# Patient Record
Sex: FEMALE | Race: WHITE | Employment: OTHER | URBAN - METROPOLITAN AREA
[De-identification: names, ages, dates, MRNs, and addresses within clinical notes are randomized per-mention and may not be internally consistent; named-entity substitution may affect disease eponyms.]

---

## 2019-07-31 ENCOUNTER — APPOINTMENT (OUTPATIENT)
Dept: CT IMAGING | Age: 49
End: 2019-07-31
Payer: COMMERCIAL

## 2019-07-31 ENCOUNTER — HOSPITAL ENCOUNTER (EMERGENCY)
Age: 49
Discharge: HOME | End: 2019-07-31
Attending: EMERGENCY MEDICINE
Payer: COMMERCIAL

## 2019-07-31 ENCOUNTER — APPOINTMENT (OUTPATIENT)
Dept: GENERAL RADIOLOGY | Age: 49
End: 2019-07-31
Payer: COMMERCIAL

## 2019-07-31 VITALS
DIASTOLIC BLOOD PRESSURE: 60 MMHG | TEMPERATURE: 97.6 F | RESPIRATION RATE: 18 BRPM | HEART RATE: 94 BPM | WEIGHT: 210.19 LBS | SYSTOLIC BLOOD PRESSURE: 121 MMHG | BODY MASS INDEX: 33.78 KG/M2 | HEIGHT: 66 IN | OXYGEN SATURATION: 95 %
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
## 2019-07-31 DIAGNOSIS — S16.1XXA ACUTE STRAIN OF NECK MUSCLE, INITIAL ENCOUNTER: ICD-10-CM

## 2019-07-31 PROCEDURE — 72125 CT NECK SPINE W/O DYE: CPT

## 2019-07-31 PROCEDURE — 72100 X-RAY EXAM L-S SPINE 2/3 VWS: CPT

## 2019-07-31 PROCEDURE — 6370000000 HC RX 637 (ALT 250 FOR IP): Performed by: NURSE PRACTITIONER

## 2019-07-31 PROCEDURE — 99284 EMERGENCY DEPT VISIT MOD MDM: CPT

## 2019-07-31 PROCEDURE — 72040 X-RAY EXAM NECK SPINE 2-3 VW: CPT

## 2019-07-31 RX ORDER — LORATADINE 10 MG/1
10 CAPSULE, LIQUID FILLED ORAL DAILY
COMMUNITY

## 2019-07-31 RX ORDER — CLONAZEPAM 0.5 MG/1
0.5 TABLET ORAL 4 TIMES DAILY
COMMUNITY

## 2019-07-31 RX ORDER — IBUPROFEN 800 MG/1
800 TABLET ORAL ONCE
Status: COMPLETED | OUTPATIENT
Start: 2019-07-31 | End: 2019-07-31

## 2019-07-31 RX ORDER — AMLODIPINE BESYLATE 2.5 MG/1
2.5 TABLET ORAL DAILY
COMMUNITY

## 2019-07-31 RX ORDER — VENLAFAXINE 75 MG/1
75 TABLET ORAL 3 TIMES DAILY
COMMUNITY

## 2019-07-31 RX ORDER — HYDROCODONE BITARTRATE AND ACETAMINOPHEN 5; 325 MG/1; MG/1
1 TABLET ORAL EVERY 6 HOURS PRN
Qty: 20 TABLET | Refills: 0 | Status: SHIPPED | OUTPATIENT
Start: 2019-07-31 | End: 2019-08-03

## 2019-07-31 RX ORDER — METHOCARBAMOL 500 MG/1
500 TABLET, FILM COATED ORAL 3 TIMES DAILY PRN
Qty: 20 TABLET | Refills: 0 | Status: SHIPPED | OUTPATIENT
Start: 2019-07-31 | End: 2019-08-10

## 2019-07-31 RX ADMIN — IBUPROFEN 800 MG: 800 TABLET, FILM COATED ORAL at 15:28

## 2019-07-31 ASSESSMENT — PAIN SCALES - GENERAL
PAINLEVEL_OUTOF10: 9
PAINLEVEL_OUTOF10: 9
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
## 2020-06-30 ENCOUNTER — HOSPITAL ENCOUNTER (OUTPATIENT)
Age: 50
Setting detail: SPECIMEN
Discharge: HOME | End: 2020-06-30
Payer: COMMERCIAL

## 2020-06-30 LAB
ABSOLUTE EOS #: 0.07 K/UL (ref 0–0.44)
ABSOLUTE IMMATURE GRANULOCYTE: <0.03 K/UL (ref 0–0.3)
ABSOLUTE LYMPH #: 1.77 K/UL (ref 1.1–3.7)
ABSOLUTE MONO #: 0.48 K/UL (ref 0.1–1.2)
ALBUMIN SERPL-MCNC: 4.3 G/DL (ref 3.5–5.2)
ALBUMIN/GLOBULIN RATIO: 1.5 (ref 1–2.5)
ALP BLD-CCNC: 61 U/L (ref 35–104)
ALT SERPL-CCNC: 70 U/L (ref 5–33)
ANION GAP SERPL CALCULATED.3IONS-SCNC: 16 MMOL/L (ref 9–17)
AST SERPL-CCNC: 43 U/L
BASOPHILS # BLD: 0 % (ref 0–2)
BASOPHILS ABSOLUTE: <0.03 K/UL (ref 0–0.2)
BILIRUB SERPL-MCNC: 0.36 MG/DL (ref 0.3–1.2)
BUN BLDV-MCNC: 13 MG/DL (ref 6–20)
BUN/CREAT BLD: ABNORMAL (ref 9–20)
CALCIUM SERPL-MCNC: 9.1 MG/DL (ref 8.6–10.4)
CHLORIDE BLD-SCNC: 101 MMOL/L (ref 98–107)
CHOLESTEROL, FASTING: 220 MG/DL
CHOLESTEROL/HDL RATIO: 5.8
CO2: 22 MMOL/L (ref 20–31)
CREAT SERPL-MCNC: 0.52 MG/DL (ref 0.5–0.9)
DIFFERENTIAL TYPE: NORMAL
EOSINOPHILS RELATIVE PERCENT: 1 % (ref 1–4)
ESTRADIOL LEVEL: 58 PG/ML (ref 27–314)
GFR AFRICAN AMERICAN: >60 ML/MIN
GFR NON-AFRICAN AMERICAN: >60 ML/MIN
GFR SERPL CREATININE-BSD FRML MDRD: ABNORMAL ML/MIN/{1.73_M2}
GFR SERPL CREATININE-BSD FRML MDRD: ABNORMAL ML/MIN/{1.73_M2}
GLUCOSE FASTING: 117 MG/DL (ref 70–99)
HCT VFR BLD CALC: 43.6 % (ref 36.3–47.1)
HDLC SERPL-MCNC: 38 MG/DL
HEMOGLOBIN: 14.3 G/DL (ref 11.9–15.1)
IMMATURE GRANULOCYTES: 0 %
LDL CHOLESTEROL: 140 MG/DL (ref 0–130)
LYMPHOCYTES # BLD: 33 % (ref 24–43)
MCH RBC QN AUTO: 31.6 PG (ref 25.2–33.5)
MCHC RBC AUTO-ENTMCNC: 32.8 G/DL (ref 28.4–34.8)
MCV RBC AUTO: 96.2 FL (ref 82.6–102.9)
MONOCYTES # BLD: 9 % (ref 3–12)
NRBC AUTOMATED: 0 PER 100 WBC
PDW BLD-RTO: 12.4 % (ref 11.8–14.4)
PLATELET # BLD: 357 K/UL (ref 138–453)
PLATELET ESTIMATE: NORMAL
PMV BLD AUTO: 10.7 FL (ref 8.1–13.5)
POTASSIUM SERPL-SCNC: 3.9 MMOL/L (ref 3.7–5.3)
PROGESTERONE LEVEL: 6.54 NG/ML
RBC # BLD: 4.53 M/UL (ref 3.95–5.11)
RBC # BLD: NORMAL 10*6/UL
SEG NEUTROPHILS: 57 % (ref 36–65)
SEGMENTED NEUTROPHILS ABSOLUTE COUNT: 2.99 K/UL (ref 1.5–8.1)
SEX HORMONE BINDING GLOBULIN: 41 NMOL/L (ref 30–135)
SODIUM BLD-SCNC: 139 MMOL/L (ref 135–144)
T3 FREE: 3.09 PG/ML (ref 2.02–4.43)
TESTOSTERONE FREE-NONMALE: 2.8 PG/ML (ref 1.1–5.8)
TESTOSTERONE TOTAL: 18 NG/DL (ref 20–70)
THYROXINE, FREE: 1.23 NG/DL (ref 0.93–1.7)
TOTAL PROTEIN: 7.1 G/DL (ref 6.4–8.3)
TRIGLYCERIDE, FASTING: 209 MG/DL
TSH SERPL DL<=0.05 MIU/L-ACNC: 1.2 MIU/L (ref 0.3–5)
VITAMIN D 25-HYDROXY: 57.2 NG/ML (ref 30–100)
VLDLC SERPL CALC-MCNC: ABNORMAL MG/DL (ref 1–30)
WBC # BLD: 5.3 K/UL (ref 3.5–11.3)
WBC # BLD: NORMAL 10*3/UL

## 2020-07-05 ENCOUNTER — OFFICE VISIT (OUTPATIENT)
Dept: FAMILY MEDICINE CLINIC | Age: 50
End: 2020-07-05
Payer: COMMERCIAL

## 2020-07-05 ENCOUNTER — HOSPITAL ENCOUNTER (OUTPATIENT)
Age: 50
Setting detail: SPECIMEN
Discharge: HOME | End: 2020-07-05
Payer: COMMERCIAL

## 2020-07-05 VITALS
HEIGHT: 67 IN | TEMPERATURE: 98 F | BODY MASS INDEX: 31.39 KG/M2 | OXYGEN SATURATION: 95 % | RESPIRATION RATE: 18 BRPM | HEART RATE: 76 BPM | WEIGHT: 200 LBS

## 2020-07-05 PROCEDURE — 1036F TOBACCO NON-USER: CPT | Performed by: NURSE PRACTITIONER

## 2020-07-05 PROCEDURE — G8427 DOCREV CUR MEDS BY ELIG CLIN: HCPCS | Performed by: NURSE PRACTITIONER

## 2020-07-05 PROCEDURE — G8417 CALC BMI ABV UP PARAM F/U: HCPCS | Performed by: NURSE PRACTITIONER

## 2020-07-05 PROCEDURE — 3017F COLORECTAL CA SCREEN DOC REV: CPT | Performed by: NURSE PRACTITIONER

## 2020-07-05 PROCEDURE — 99202 OFFICE O/P NEW SF 15 MIN: CPT | Performed by: NURSE PRACTITIONER

## 2020-07-05 ASSESSMENT — ENCOUNTER SYMPTOMS
ABDOMINAL PAIN: 0
SINUS PAIN: 0
COUGH: 1
DIARRHEA: 0
NAUSEA: 0
SORE THROAT: 1
VOMITING: 0
SHORTNESS OF BREATH: 0

## 2020-07-05 ASSESSMENT — PATIENT HEALTH QUESTIONNAIRE - PHQ9
1. LITTLE INTEREST OR PLEASURE IN DOING THINGS: 0
SUM OF ALL RESPONSES TO PHQ9 QUESTIONS 1 & 2: 0
SUM OF ALL RESPONSES TO PHQ QUESTIONS 1-9: 0
2. FEELING DOWN, DEPRESSED OR HOPELESS: 0
SUM OF ALL RESPONSES TO PHQ QUESTIONS 1-9: 0

## 2020-07-05 NOTE — LETTER
[address] [place] [address]  [place] [address]  Phone: [phone]  Fax: [phone]    Dennise Collet, APRN - CNP        July 5, 2020     Patient: Ventura Flannery   YOB: 1970   Date of Visit: 7/5/2020       To Whom It May Concern: It is my medical opinion that Ventura Flannery is excused pending COVID results. If you have any questions or concerns, please don't hesitate to call.     Sincerely,        Dennise Collet, APRN - CNP

## 2020-07-05 NOTE — PATIENT INSTRUCTIONS
Patient Education        Coronavirus (MYQNG-05): Care Instructions  Overview  The coronavirus disease (COVID-19) is caused by a virus. Symptoms may include a fever, a cough, and shortness of breath. It mainly spreads person-to-person through droplets from coughing and sneezing. The virus also can spread when people are in close contact with someone who is infected. Most people have mild symptoms and can take care of themselves at home. If their symptoms get worse, they may need care in a hospital. There is no medicine to fight the virus. It's important to not spread the virus to others. If you have COVID-19, wear a face cover anytime you are around other people. You need to isolate yourself while you are sick. Your doctor or local public health official will tell you when you no longer need to be isolated. Leave your home only if you need to get medical care. Follow-up care is a key part of your treatment and safety. Be sure to make and go to all appointments, and call your doctor if you are having problems. It's also a good idea to know your test results and keep a list of the medicines you take. How can you care for yourself at home? · Get extra rest. It can help you feel better. · Drink plenty of fluids. This helps replace fluids lost from fever. Fluids also help ease a scratchy throat. Water, soup, fruit juice, and hot tea with lemon are good choices. · Take acetaminophen (such as Tylenol) to reduce a fever. It may also help with muscle aches. Read and follow all instructions on the label. · Sponge your body with lukewarm water to help with fever. Don't use cold water or ice. · Use petroleum jelly on sore skin. This can help if the skin around your nose and lips becomes sore from rubbing a lot with tissues. Tips for isolation  · Wear a cloth face cover when you are around other people. It can help stop the spread of the virus when you cough or sneeze. · Limit contact with people in your home.  If possible, stay in a separate bedroom and use a separate bathroom. · If you have to leave home, avoid crowds and try to stay at least 6 feet away from other people. · Avoid contact with pets and other animals. · Cover your mouth and nose with a tissue when you cough or sneeze. Then throw it in the trash right away. · Wash your hands often, especially after you cough or sneeze. Use soap and water, and scrub for at least 20 seconds. If soap and water aren't available, use an alcohol-based hand . · Don't share personal household items. These include bedding, towels, cups and glasses, and eating utensils. · [address] in the warmest water allowed for the fabric type, and dry it completely. It's okay to wash other people's laundry with yours. · Clean and disinfect your home every day. Use household  and disinfectant wipes or sprays. Take special care to clean things that you grab with your hands. These include doorknobs, remote controls, phones, and handles on your refrigerator and microwave. And don't forget countertops, tabletops, bathrooms, and computer keyboards. When should you call for help? [id number] anytime you think you may need emergency care. For example, call if you have life-threatening symptoms, such as:  · You have severe trouble breathing. (You can't talk at all.)  · You have constant chest pain or pressure. · You are severely dizzy or lightheaded. · You are confused or can't think clearly. · Your face and lips have a blue color. · You pass out (lose consciousness) or are very hard to wake up. Call your doctor now or seek immediate medical care if:  · You have moderate trouble breathing. (You can't speak a full sentence.)  · You are coughing up blood (more than about 1 teaspoon). · You have signs of low blood pressure. These include feeling lightheaded; being too weak to stand; and having cold, pale, clammy skin.   Watch closely for changes in your health, and be sure to contact your doctor if:  · Your symptoms get worse. · You are not getting better as expected. Call before you go to the doctor's office. Follow their instructions. And wear a cloth face cover. Current as of: May 8, 2020               Content Version: 12.5  © 2006-2020 [employer]. Care instructions adapted under license by [facility] ([facility]). If you have questions about a medical condition or this instruction, always ask your healthcare professional. [address] any warranty or liability for your use of this information.

## 2020-07-05 NOTE — PROGRESS NOTES
[address]  [address] [facility] [address] [address]  Dept: [phone]  Dept Fax: [phone]    Mumtaz Hewitt is a 48 y.o. female who presents to the urgent care today for her medicalconditions/complaints as noted below. Mumtaz Hewitt is c/o of Headache (x3 days ) and Pharyngitis      HPI:     59-year-old female patient presents with multiple symptoms. Describes that she has had a mild headache and sore throat for 3 days. Patient has been around her daughter who has been exposed to a known positive for COVID. Describes sore throat as mild discomfort with swallowing, additionally has nasal congestion, rhinorrhea as well as mild headache. Denies chest pain or shortness of breath. Reports mild cough. Denies fevers or chills. Denies vomiting or diarrhea. Patient is here with daughter who is also being tested. No past medical history on file. Current Outpatient Medications   Medication Sig Dispense Refill    venlafaxine (EFFEXOR) 75 MG tablet Take 75 mg by mouth 3 times daily      clonazePAM (KLONOPIN) 0.5 MG tablet Take 0.5 mg by mouth 4 times daily.  metoprolol tartrate (LOPRESSOR) 25 MG tablet Take 25 mg by mouth 2 times daily      amLODIPine (NORVASC) 2.5 MG tablet Take 2.5 mg by mouth daily      loratadine (CLARITIN) 10 MG capsule Take 10 mg by mouth daily       No current facility-administered medications for this visit. Allergies   Allergen Reactions    Asa [Aspirin] Nausea Only       Subjective:      Review of Systems   Constitutional: Negative for chills and fever. HENT: Positive for congestion and sore throat. Negative for ear pain and sinus pain. Respiratory: Positive for cough. Negative for shortness of breath. Cardiovascular: Negative for chest pain and palpitations. Gastrointestinal: Negative for abdominal pain, diarrhea, nausea and vomiting. Neurological: Negative for dizziness and headaches.    All other systems reviewed and are negative. Objective:     Physical Exam  Vitals signs and nursing note reviewed. Constitutional:       Appearance: Normal appearance. HENT:      Nose: Nose normal.      Mouth/Throat:      Mouth: Mucous membranes are moist.   Cardiovascular:      Rate and Rhythm: Normal rate. Pulmonary:      Effort: Pulmonary effort is normal. No respiratory distress. Breath sounds: Normal breath sounds. Skin:     General: Skin is warm and dry. Neurological:      General: No focal deficit present. Mental Status: She is alert and oriented to person, place, and time.        Pulse 76   Temp 98 °F (36.7 °C)   Resp 18   Ht 5' 7\" (1.702 m)   Wt 200 lb (90.7 kg)   SpO2 95%   BMI 31.32 kg/m²   Lab Review   Hospital Outpatient Visit on 06/30/2020   Component Date Value    WBC 06/30/2020 5.3     RBC 06/30/2020 4.53     Hemoglobin 06/30/2020 14.3     Hematocrit 06/30/2020 43.6     MCV 06/30/2020 96.2     MCH 06/30/2020 31.6     MCHC 06/30/2020 32.8     RDW 06/30/2020 12.4     Platelets 16/64/7309 357     MPV 06/30/2020 10.7     NRBC Automated 06/30/2020 0.0     Differential Type 06/30/2020 NOT REPORTED     Seg Neutrophils 06/30/2020 57     Lymphocytes 06/30/2020 33     Monocytes 06/30/2020 9     Eosinophils % 06/30/2020 1     Basophils 06/30/2020 0     Immature Granulocytes 06/30/2020 0     Segs Absolute 06/30/2020 2.99     Absolute Lymph # 06/30/2020 1.77     Absolute Mono # 06/30/2020 0.48     Absolute Eos # 06/30/2020 0.07     Basophils Absolute 06/30/2020 <0.03     Absolute Immature Granul* 06/30/2020 <0.03     WBC Morphology 06/30/2020 NOT REPORTED     RBC Morphology 06/30/2020 NOT REPORTED     Platelet Estimate 86/51/3936 NOT REPORTED     Glucose, Fasting 06/30/2020 117*    BUN 06/30/2020 13     CREATININE 06/30/2020 0.52     Bun/Cre Ratio 06/30/2020 NOT REPORTED     Calcium 06/30/2020 9.1     Sodium 06/30/2020 139     Potassium 06/30/2020 3.9 [email or web address]    Prevention steps for People with confirmed or suspected COVID-19 (including persons under investigation) who do not need to be hospitalized  and   People with confirmed COVID-19 who were hospitalized and determined to be medically stable to go home    Your healthcare provider and public health staff will evaluate whether you can be cared for at home. If it is determined that you do not need to be hospitalized and can be isolated at home, you will be monitored by staff from your local or state health department. You should follow the prevention steps below until a healthcare provider or local or state health department says you can return to your normal activities. Stay home except to get medical care  People who are mildly ill with COVID-19 are able to isolate at home during their illness. You should restrict activities outside your home, except for getting medical care. Do not go to work, school, or public areas. Avoid using public transportation, ride-sharing, or taxis. Separate yourself from other people and animals in your home  People: As much as possible, you should stay in a specific room and away from other people in your home. Also, you should use a separate bathroom, if available. Animals: You should restrict contact with pets and other animals while you are sick with COVID-19, just like you would around other people. Although there have not been reports of pets or other animals becoming sick with COVID-19, it is still recommended that people sick with COVID-19 limit contact with animals until more information is known about the virus. When possible, have another member of your household care for your animals while you are sick. If you are sick with COVID-19, avoid contact with your pet, including petting, snuggling, being kissed or licked, and sharing food.  If you must care for your pet or be around animals while you are tabletops, doorknobs, bathroom fixtures, toilets, phones, keyboards, tablets, and bedside tables. Also, clean any surfaces that may have blood, stool, or body fluids on them. Use a household cleaning spray or wipe, according to the label instructions. Labels contain instructions for safe and effective use of the cleaning product including precautions you should take when applying the product, such as wearing gloves and making sure you have good ventilation during use of the product. Monitor your symptoms  Seek prompt medical attention if your illness is worsening (e.g., difficulty breathing). Before seeking care, call your healthcare provider and tell them that you have, or are being evaluated for, COVID-19. Put on a facemask before you enter the facility. These steps will help the healthcare providers office to keep other people in the office or waiting room from getting infected or exposed. Ask your healthcare provider to call the local or state health department. Persons who are placed under active monitoring or facilitated self-monitoring should follow instructions provided by their local health department or occupational health professionals, as appropriate. When working with your local health department check their available hours. If you have a medical emergency and need to call 911, notify the dispatch personnel that you have, or are being evaluated for COVID-19. If possible, put on a facemask before emergency medical services arrive. Discontinuing home isolation  Patients with confirmed COVID-19 should remain under home isolation precautions until the risk of secondary transmission to others is thought to be low. The decision to discontinue home isolation precautions should be made on a case-by-case basis, in consultation with healthcare providers and state and local health departments. Patient given educational materials - see patient instructions. Discussed use, benefit, and side effects of prescribed medications. All patientquestions answered. Pt voiced understanding. This note was transcribed using dictation with Dragon services. Efforts were made to correct any errors but some words may be misinterpreted.      Electronically signed by ARTEMIO Meyer CNP on 7/5/2020at 3:44 PM

## 2020-07-08 LAB — SARS-COV-2, NAA: NOT DETECTED

## 2020-07-09 ENCOUNTER — TELEPHONE (OUTPATIENT)
Dept: PRIMARY CARE CLINIC | Age: 50
End: 2020-07-09

## 2020-07-28 ENCOUNTER — HOSPITAL ENCOUNTER (OUTPATIENT)
Age: 50
Setting detail: SPECIMEN
Discharge: HOME | End: 2020-07-28
Payer: COMMERCIAL

## 2020-07-28 LAB
ESTIMATED AVERAGE GLUCOSE: 103 MG/DL
HBA1C MFR BLD: 5.2 % (ref 4–6)
HBV SURFACE AB TITR SER: <3.5 MIU/ML
HEPATITIS B CORE TOTAL ANTIBODY: NONREACTIVE
HEPATITIS C ANTIBODY: NONREACTIVE

## 2020-11-10 ENCOUNTER — HOSPITAL ENCOUNTER (OUTPATIENT)
Age: 50
Setting detail: SPECIMEN
Discharge: HOME | End: 2020-11-10
Payer: COMMERCIAL

## 2020-11-10 LAB
ALBUMIN SERPL-MCNC: 4.5 G/DL (ref 3.5–5.2)
ALBUMIN/GLOBULIN RATIO: 1.7 (ref 1–2.5)
ALP BLD-CCNC: 58 U/L (ref 35–104)
ALT SERPL-CCNC: 40 U/L (ref 5–33)
ANION GAP SERPL CALCULATED.3IONS-SCNC: 14 MMOL/L (ref 9–17)
AST SERPL-CCNC: 27 U/L
BILIRUB SERPL-MCNC: 0.31 MG/DL (ref 0.3–1.2)
BILIRUBIN DIRECT: 0.08 MG/DL
BILIRUBIN, INDIRECT: 0.23 MG/DL (ref 0–1)
BUN BLDV-MCNC: 10 MG/DL (ref 6–20)
BUN/CREAT BLD: ABNORMAL (ref 9–20)
CALCIUM SERPL-MCNC: 9.4 MG/DL (ref 8.6–10.4)
CHLORIDE BLD-SCNC: 104 MMOL/L (ref 98–107)
CHOLESTEROL/HDL RATIO: 4.9
CHOLESTEROL: 204 MG/DL
CO2: 22 MMOL/L (ref 20–31)
CREAT SERPL-MCNC: 0.62 MG/DL (ref 0.5–0.9)
GFR AFRICAN AMERICAN: >60 ML/MIN
GFR NON-AFRICAN AMERICAN: >60 ML/MIN
GFR SERPL CREATININE-BSD FRML MDRD: ABNORMAL ML/MIN/{1.73_M2}
GFR SERPL CREATININE-BSD FRML MDRD: ABNORMAL ML/MIN/{1.73_M2}
GLOBULIN: ABNORMAL G/DL (ref 1.5–3.8)
GLUCOSE BLD-MCNC: 129 MG/DL (ref 70–99)
HDLC SERPL-MCNC: 42 MG/DL
LDL CHOLESTEROL: 133 MG/DL (ref 0–130)
POTASSIUM SERPL-SCNC: 4.1 MMOL/L (ref 3.7–5.3)
SODIUM BLD-SCNC: 140 MMOL/L (ref 135–144)
TOTAL PROTEIN: 7.2 G/DL (ref 6.4–8.3)
TRIGL SERPL-MCNC: 145 MG/DL
TSH SERPL DL<=0.05 MIU/L-ACNC: 0.94 MIU/L (ref 0.3–5)
VLDLC SERPL CALC-MCNC: ABNORMAL MG/DL (ref 1–30)

## 2021-07-13 ENCOUNTER — HOSPITAL ENCOUNTER (OUTPATIENT)
Age: 51
Setting detail: SPECIMEN
Discharge: HOME | End: 2021-07-13
Payer: COMMERCIAL

## 2021-07-13 LAB
ALBUMIN SERPL-MCNC: 4.4 G/DL (ref 3.5–5.2)
ALBUMIN/GLOBULIN RATIO: 1.3 (ref 1–2.5)
ALP BLD-CCNC: 58 U/L (ref 35–104)
ALT SERPL-CCNC: 36 U/L (ref 5–33)
ANION GAP SERPL CALCULATED.3IONS-SCNC: 15 MMOL/L (ref 9–17)
AST SERPL-CCNC: 27 U/L
BILIRUB SERPL-MCNC: 0.31 MG/DL (ref 0.3–1.2)
BILIRUBIN DIRECT: <0.08 MG/DL
BILIRUBIN, INDIRECT: ABNORMAL MG/DL (ref 0–1)
BUN BLDV-MCNC: 13 MG/DL (ref 6–20)
BUN/CREAT BLD: NORMAL (ref 9–20)
CALCIUM SERPL-MCNC: 9.4 MG/DL (ref 8.6–10.4)
CHLORIDE BLD-SCNC: 98 MMOL/L (ref 98–107)
CHOLESTEROL, FASTING: 254 MG/DL
CHOLESTEROL/HDL RATIO: 5.6
CO2: 26 MMOL/L (ref 20–31)
CREAT SERPL-MCNC: 0.62 MG/DL (ref 0.5–0.9)
ESTIMATED AVERAGE GLUCOSE: 117 MG/DL
GFR AFRICAN AMERICAN: >60 ML/MIN
GFR NON-AFRICAN AMERICAN: >60 ML/MIN
GFR SERPL CREATININE-BSD FRML MDRD: NORMAL ML/MIN/{1.73_M2}
GFR SERPL CREATININE-BSD FRML MDRD: NORMAL ML/MIN/{1.73_M2}
GLOBULIN: ABNORMAL G/DL (ref 1.5–3.8)
GLUCOSE FASTING: 98 MG/DL (ref 70–99)
HBA1C MFR BLD: 5.7 % (ref 4–6)
HCT VFR BLD CALC: 43.6 % (ref 36.3–47.1)
HDLC SERPL-MCNC: 45 MG/DL
HEMOGLOBIN: 14.4 G/DL (ref 11.9–15.1)
LDL CHOLESTEROL: 172 MG/DL (ref 0–130)
MCH RBC QN AUTO: 30.8 PG (ref 25.2–33.5)
MCHC RBC AUTO-ENTMCNC: 33 G/DL (ref 28.4–34.8)
MCV RBC AUTO: 93.4 FL (ref 82.6–102.9)
NRBC AUTOMATED: 0 PER 100 WBC
PDW BLD-RTO: 12.5 % (ref 11.8–14.4)
PLATELET # BLD: 378 K/UL (ref 138–453)
PMV BLD AUTO: 9.6 FL (ref 8.1–13.5)
POTASSIUM SERPL-SCNC: 4.4 MMOL/L (ref 3.7–5.3)
RBC # BLD: 4.67 M/UL (ref 3.95–5.11)
SODIUM BLD-SCNC: 139 MMOL/L (ref 135–144)
TOTAL PROTEIN: 7.7 G/DL (ref 6.4–8.3)
TRIGLYCERIDE, FASTING: 187 MG/DL
TSH SERPL DL<=0.05 MIU/L-ACNC: 1.21 MIU/L (ref 0.3–5)
VLDLC SERPL CALC-MCNC: ABNORMAL MG/DL (ref 1–30)
WBC # BLD: 5.4 K/UL (ref 3.5–11.3)

## 2021-08-24 ENCOUNTER — HOSPITAL ENCOUNTER (OUTPATIENT)
Age: 51
Setting detail: SPECIMEN
Discharge: HOME | End: 2021-08-24
Payer: COMMERCIAL

## 2021-08-24 LAB
ALBUMIN SERPL-MCNC: 4.6 G/DL (ref 3.5–5.2)
ALBUMIN/GLOBULIN RATIO: 1.6 (ref 1–2.5)
ALP BLD-CCNC: 60 U/L (ref 35–104)
ALT SERPL-CCNC: 29 U/L (ref 5–33)
AST SERPL-CCNC: 23 U/L
BILIRUB SERPL-MCNC: 0.22 MG/DL (ref 0.3–1.2)
BILIRUBIN DIRECT: <0.08 MG/DL
BILIRUBIN, INDIRECT: ABNORMAL MG/DL (ref 0–1)
CHOLESTEROL, FASTING: 222 MG/DL
CHOLESTEROL/HDL RATIO: 5.2
GLOBULIN: ABNORMAL G/DL (ref 1.5–3.8)
HDLC SERPL-MCNC: 43 MG/DL
LDL CHOLESTEROL: 157 MG/DL (ref 0–130)
TOTAL PROTEIN: 7.4 G/DL (ref 6.4–8.3)
TRIGLYCERIDE, FASTING: 111 MG/DL
VLDLC SERPL CALC-MCNC: ABNORMAL MG/DL (ref 1–30)

## 2022-04-18 ENCOUNTER — HOSPITAL ENCOUNTER (OUTPATIENT)
Age: 52
Setting detail: SPECIMEN
Discharge: HOME | End: 2022-04-18

## 2022-04-18 LAB
ALBUMIN SERPL-MCNC: 4.6 G/DL (ref 3.5–5.2)
ALBUMIN/GLOBULIN RATIO: 1.4 (ref 1–2.5)
ALP BLD-CCNC: 81 U/L (ref 35–104)
ALT SERPL-CCNC: 67 U/L (ref 5–33)
ANION GAP SERPL CALCULATED.3IONS-SCNC: 12 MMOL/L (ref 9–17)
AST SERPL-CCNC: 47 U/L
BILIRUB SERPL-MCNC: 0.27 MG/DL (ref 0.3–1.2)
BUN BLDV-MCNC: 14 MG/DL (ref 6–20)
CALCIUM SERPL-MCNC: 9.8 MG/DL (ref 8.6–10.4)
CHLORIDE BLD-SCNC: 102 MMOL/L (ref 98–107)
CHOLESTEROL, FASTING: 249 MG/DL
CHOLESTEROL/HDL RATIO: 5.1
CO2: 25 MMOL/L (ref 20–31)
CREAT SERPL-MCNC: 0.54 MG/DL (ref 0.5–0.9)
GFR AFRICAN AMERICAN: >60 ML/MIN
GFR NON-AFRICAN AMERICAN: >60 ML/MIN
GFR SERPL CREATININE-BSD FRML MDRD: ABNORMAL ML/MIN/{1.73_M2}
GLUCOSE BLD-MCNC: 114 MG/DL (ref 70–99)
HDLC SERPL-MCNC: 49 MG/DL
LDL CHOLESTEROL: 168 MG/DL (ref 0–130)
POTASSIUM SERPL-SCNC: 4.1 MMOL/L (ref 3.7–5.3)
SODIUM BLD-SCNC: 139 MMOL/L (ref 135–144)
TOTAL PROTEIN: 7.8 G/DL (ref 6.4–8.3)
TRIGLYCERIDE, FASTING: 162 MG/DL

## 2022-10-19 ENCOUNTER — HOSPITAL ENCOUNTER (OUTPATIENT)
Age: 52
Setting detail: SPECIMEN
Discharge: HOME | End: 2022-10-19

## 2022-10-19 LAB
ALBUMIN SERPL-MCNC: 4.5 G/DL (ref 3.5–5.2)
ALBUMIN/GLOBULIN RATIO: 1.3 (ref 1–2.5)
ALP BLD-CCNC: 80 U/L (ref 35–104)
ALT SERPL-CCNC: 48 U/L (ref 5–33)
ANION GAP SERPL CALCULATED.3IONS-SCNC: 18 MMOL/L (ref 9–17)
AST SERPL-CCNC: 40 U/L
BILIRUB SERPL-MCNC: 0.2 MG/DL (ref 0.3–1.2)
BILIRUBIN DIRECT: <0.1 MG/DL
BILIRUBIN, INDIRECT: ABNORMAL MG/DL (ref 0–1)
BUN BLDV-MCNC: 10 MG/DL (ref 6–20)
CALCIUM SERPL-MCNC: 10 MG/DL (ref 8.6–10.4)
CHLORIDE BLD-SCNC: 107 MMOL/L (ref 98–107)
CHOLESTEROL, FASTING: 247 MG/DL
CHOLESTEROL/HDL RATIO: 5.7
CO2: 21 MMOL/L (ref 20–31)
CREAT SERPL-MCNC: 0.72 MG/DL (ref 0.5–0.9)
GFR SERPL CREATININE-BSD FRML MDRD: >60 ML/MIN/1.73M2
GLUCOSE BLD-MCNC: 140 MG/DL (ref 70–99)
HAV IGM SER IA-ACNC: NONREACTIVE
HDLC SERPL-MCNC: 43 MG/DL
HEPATITIS B CORE IGM ANTIBODY: NONREACTIVE
HEPATITIS B SURFACE ANTIGEN: NONREACTIVE
HEPATITIS C ANTIBODY: NONREACTIVE
LDL CHOLESTEROL: 157 MG/DL (ref 0–130)
POTASSIUM SERPL-SCNC: 4.1 MMOL/L (ref 3.7–5.3)
SODIUM BLD-SCNC: 146 MMOL/L (ref 135–144)
TOTAL PROTEIN: 8 G/DL (ref 6.4–8.3)
TRIGLYCERIDE, FASTING: 233 MG/DL
TSH SERPL DL<=0.05 MIU/L-ACNC: 0.82 UIU/ML (ref 0.3–5)

## 2022-10-20 LAB
ESTIMATED AVERAGE GLUCOSE: 111 MG/DL
HBA1C MFR BLD: 5.5 % (ref 4–6)
VITAMIN D 25-HYDROXY: 99.8 NG/ML

## 2024-11-27 ENCOUNTER — OFFICE VISIT (OUTPATIENT)
Dept: BARIATRICS/WEIGHT MGMT | Age: 54
End: 2024-11-27
Payer: MEDICARE

## 2024-11-27 VITALS
HEIGHT: 64 IN | BODY MASS INDEX: 35.51 KG/M2 | SYSTOLIC BLOOD PRESSURE: 118 MMHG | DIASTOLIC BLOOD PRESSURE: 80 MMHG | HEART RATE: 80 BPM | WEIGHT: 208 LBS

## 2024-11-27 DIAGNOSIS — G47.33 OSA (OBSTRUCTIVE SLEEP APNEA): ICD-10-CM

## 2024-11-27 DIAGNOSIS — I72.5 BASILAR ARTERY ANEURYSM (HCC): ICD-10-CM

## 2024-11-27 DIAGNOSIS — E66.01 CLASS 2 SEVERE OBESITY DUE TO EXCESS CALORIES WITH SERIOUS COMORBIDITY AND BODY MASS INDEX (BMI) OF 36.0 TO 36.9 IN ADULT: ICD-10-CM

## 2024-11-27 DIAGNOSIS — I10 PRIMARY HYPERTENSION: Primary | ICD-10-CM

## 2024-11-27 DIAGNOSIS — E66.812 CLASS 2 SEVERE OBESITY DUE TO EXCESS CALORIES WITH SERIOUS COMORBIDITY AND BODY MASS INDEX (BMI) OF 36.0 TO 36.9 IN ADULT: ICD-10-CM

## 2024-11-27 PROCEDURE — 3074F SYST BP LT 130 MM HG: CPT | Performed by: NURSE PRACTITIONER

## 2024-11-27 PROCEDURE — 99204 OFFICE O/P NEW MOD 45 MIN: CPT | Performed by: NURSE PRACTITIONER

## 2024-11-27 PROCEDURE — 3079F DIAST BP 80-89 MM HG: CPT | Performed by: NURSE PRACTITIONER

## 2024-11-27 RX ORDER — ZINC GLUCONATE 50 MG
50 TABLET ORAL DAILY
COMMUNITY

## 2024-11-27 RX ORDER — CYCLOBENZAPRINE HCL 10 MG
10 TABLET ORAL 3 TIMES DAILY PRN
COMMUNITY

## 2024-11-27 RX ORDER — GINSENG 100 MG
1 CAPSULE ORAL 2 TIMES DAILY
COMMUNITY
Start: 2024-11-07

## 2024-11-27 RX ORDER — TOPIRAMATE 50 MG/1
50 TABLET, FILM COATED ORAL 2 TIMES DAILY
COMMUNITY
Start: 2024-11-08

## 2024-11-27 RX ORDER — FLUTICASONE PROPIONATE 50 MCG
1 SPRAY, SUSPENSION (ML) NASAL DAILY
COMMUNITY
Start: 2024-08-19

## 2024-11-27 RX ORDER — AZELASTINE 1 MG/ML
1 SPRAY, METERED NASAL 2 TIMES DAILY
COMMUNITY
Start: 2024-10-28

## 2024-11-27 RX ORDER — MAGNESIUM OXIDE 400 MG/1
400 TABLET ORAL NIGHTLY
COMMUNITY

## 2024-11-27 RX ORDER — ETODOLAC 400 MG/1
400 TABLET, FILM COATED ORAL 2 TIMES DAILY
COMMUNITY

## 2024-11-27 RX ORDER — ACYCLOVIR 50 MG/G
1 OINTMENT TOPICAL 3 TIMES DAILY PRN
COMMUNITY

## 2024-11-27 RX ORDER — NYSTATIN 100000 U/G
1 CREAM TOPICAL PRN
COMMUNITY
Start: 2024-11-07

## 2024-11-27 RX ORDER — FLUOXETINE 10 MG/1
10 TABLET, FILM COATED ORAL DAILY
COMMUNITY
Start: 2024-10-25

## 2024-11-27 RX ORDER — POTASSIUM CHLORIDE 750 MG/1
10 TABLET, EXTENDED RELEASE ORAL DAILY
COMMUNITY
Start: 2024-02-27

## 2024-11-27 RX ORDER — PYRIDOXINE HCL (VITAMIN B6) 25 MG
25 TABLET ORAL NIGHTLY
COMMUNITY

## 2024-11-27 RX ORDER — METHOCARBAMOL 750 MG/1
50000 TABLET ORAL WEEKLY
COMMUNITY

## 2024-11-27 RX ORDER — ASPIRIN 81 MG/1
81 TABLET ORAL DAILY
COMMUNITY

## 2024-11-27 RX ORDER — HYDROCHLOROTHIAZIDE 12.5 MG/1
12.5 TABLET ORAL DAILY
COMMUNITY
Start: 2024-08-20

## 2024-11-27 RX ORDER — L. ACIDOPHILUS/PECTIN, CITRUS 25MM-100MG
1 TABLET ORAL
COMMUNITY

## 2024-11-27 RX ORDER — ACETAMINOPHEN AND CODEINE PHOSPHATE 300; 30 MG/1; MG/1
1 TABLET ORAL EVERY 6 HOURS PRN
COMMUNITY
Start: 2024-09-10

## 2024-11-27 NOTE — PROGRESS NOTES
[facility], [facility] INVASIVE BARIATRIC SURG  [address]  [address]  [place] [id number]  Dept: [phone]  Fax: [phone]    MEDICATION WEIGHT LOSS MANAGEMENT PROGRAM  PROGRESS NOTE     Subjective:      Chief Complaint   Patient presents with    Consultation    Discuss Medications    Weight Loss         Socorro Ramirez is a 54 y.o. female who is here today for evaluation and treatment of obesity. Patient cites health, increased physical ability, self-image as reasons for wanting to lose weight.    Obesity History  Period of greatest weight gain: 120 lbs during mid adult years  Lowest adult weight: 130 lbs at age 40 - she was running 5 miles per day and had healthier diet  Highest adult weight: 252 lbs at age 48 due to stress, menopause, migraines, cerebral aneurysm  Amount of time at present weight: she was 252 lbs from age 48 until January of this year when she was put on Zepbound.       History of Weight Loss Efforts    She has tried Low carb diet, low fat diet without much success.     She states she did paleo at one time - she lost 16 lbs but it couldn't lose more then that.     Patient was started on zepbound January 2024. She states she lost 50 lbs. She was using the savings card through drug company but she switched to Medicare and no longer qualified for it. Cost went to have $1000 per month.   She has been eating more high protein with being on zepbound, she try to reduce carbohydrates.     She is currently on 12.5mg of zepbound and taking it every 2 weeks to help the prescription last longer. She does have some nausea with it but she states she no longer felt like the 10mg was effective so that is why it was increased.     Current Exercise Habits no regular exercise currently. She was previously swimming and she does do some walking. She cannot do intense exercise due to cerebral aneurysm.     Current Eating Habits  Number of regular meals per day:

## 2024-12-26 ENCOUNTER — TELEPHONE (OUTPATIENT)
Dept: BARIATRICS/WEIGHT MGMT | Age: 54
End: 2024-12-26

## 2024-12-26 NOTE — TELEPHONE ENCOUNTER
Patient called to notify she started her semaglutide through [facility] about 2 weeks ago and is now starting her third shot. Reports was instructed to call for a refill now due to timing with refills and will be out by her next scheduled appointment (1/8/25).

## 2024-12-30 NOTE — TELEPHONE ENCOUNTER
Patient would like dose increased, states the dose she is on, is not taking the \"food cravings\" away

## 2025-01-02 ENCOUNTER — OFFICE VISIT (OUTPATIENT)
Dept: BARIATRICS/WEIGHT MGMT | Age: 55
End: 2025-01-02
Payer: MEDICARE

## 2025-01-02 VITALS
SYSTOLIC BLOOD PRESSURE: 100 MMHG | BODY MASS INDEX: 35.34 KG/M2 | WEIGHT: 207 LBS | HEART RATE: 80 BPM | DIASTOLIC BLOOD PRESSURE: 80 MMHG | HEIGHT: 64 IN

## 2025-01-02 DIAGNOSIS — E66.01 CLASS 2 SEVERE OBESITY DUE TO EXCESS CALORIES WITH SERIOUS COMORBIDITY AND BODY MASS INDEX (BMI) OF 36.0 TO 36.9 IN ADULT: ICD-10-CM

## 2025-01-02 DIAGNOSIS — G47.33 OSA (OBSTRUCTIVE SLEEP APNEA): Primary | ICD-10-CM

## 2025-01-02 DIAGNOSIS — E66.812 CLASS 2 SEVERE OBESITY DUE TO EXCESS CALORIES WITH SERIOUS COMORBIDITY AND BODY MASS INDEX (BMI) OF 36.0 TO 36.9 IN ADULT: ICD-10-CM

## 2025-01-02 DIAGNOSIS — I10 PRIMARY HYPERTENSION: ICD-10-CM

## 2025-01-02 PROCEDURE — 99213 OFFICE O/P EST LOW 20 MIN: CPT | Performed by: NURSE PRACTITIONER

## 2025-01-02 PROCEDURE — 3079F DIAST BP 80-89 MM HG: CPT | Performed by: NURSE PRACTITIONER

## 2025-01-02 PROCEDURE — 3074F SYST BP LT 130 MM HG: CPT | Performed by: NURSE PRACTITIONER

## 2025-01-02 RX ORDER — NYSTATIN 100000 [USP'U]/G
1 POWDER TOPICAL 2 TIMES DAILY
COMMUNITY
Start: 2024-12-03

## 2025-01-02 ASSESSMENT — ENCOUNTER SYMPTOMS
ABDOMINAL PAIN: 0
COUGH: 0
CHEST TIGHTNESS: 0
WHEEZING: 0
SHORTNESS OF BREATH: 0
CONSTIPATION: 0
NAUSEA: 0
VOMITING: 0

## 2025-01-02 NOTE — PROGRESS NOTES
[facility] INVASIVE BARIATRIC SURG  [address]  [address]  [place] [id number]  Dept: [phone]    MEDICATION WEIGHT LOSS MANAGEMENT PROGRAM  PROGRESS NOTE     CC: Weight Loss    Patient: Socorro Ramirez      Service Date: 1/2/2025  Visit:   1 month follow up on medications    Medical Record #: [id number]  Current Visit Weight Information  Weight: 93.9 kg (207 lb)   BMI: Body mass index is 36.09 kg/m².        History: 54 y.o. female who presents today for a 1 month follow up on weight loss medication. Patient has been following monthly with our office for the management of obesity since November 2024.      Patient was started on zepbound January 2024. She states she lost 50 lbs. She was using the savings card through drug company but she switched to Medicare and no longer qualified for it. Cost went to have $1000 per month.      She was previously on 12.5mg of zepbound and taking it every 2 weeks to help the prescription last longer. That was not able to be continued due to cost so we switched to compounded semaglutide.     Medication: Semaglutide through [facility]  Amount of weight loss in the last month: 1 lbs  Amount of weight loss total: 1 lbs  Current dose: 1.2 mg  Side Effects: none.     Denies abdominal pain, nausea, vomiting, constipation, diarrhea.     She did well with the 1.2mg. She states that she was doing well with the dose but has noticed more cravings, stronger appetite with the compounding dose. She understands that starting a lower dose was needed to make sure she tolerated it okay. She is happy to see that she lost 1 lb since her appetite did increase and it was over the holiday.    Her last injection is tomorrow of the 1.2mg. We called in the increased dose of 1.8mg on 12/30/2024 so she is waiting to hear back from the compounding pharmacy.     Height: 1.613 m (5' 3.5\")  Highest Weight:   208 lbs      Exercising?   
[facility]  [phone]

## 2025-01-27 ENCOUNTER — OFFICE VISIT (OUTPATIENT)
Dept: BARIATRICS/WEIGHT MGMT | Age: 55
End: 2025-01-27
Payer: MEDICARE

## 2025-01-27 VITALS
SYSTOLIC BLOOD PRESSURE: 100 MMHG | HEIGHT: 64 IN | WEIGHT: 207 LBS | BODY MASS INDEX: 35.34 KG/M2 | DIASTOLIC BLOOD PRESSURE: 60 MMHG | HEART RATE: 70 BPM

## 2025-01-27 DIAGNOSIS — I10 PRIMARY HYPERTENSION: Primary | ICD-10-CM

## 2025-01-27 DIAGNOSIS — E66.812 CLASS 2 SEVERE OBESITY DUE TO EXCESS CALORIES WITH SERIOUS COMORBIDITY AND BODY MASS INDEX (BMI) OF 36.0 TO 36.9 IN ADULT: ICD-10-CM

## 2025-01-27 DIAGNOSIS — E66.01 CLASS 2 SEVERE OBESITY DUE TO EXCESS CALORIES WITH SERIOUS COMORBIDITY AND BODY MASS INDEX (BMI) OF 36.0 TO 36.9 IN ADULT: ICD-10-CM

## 2025-01-27 DIAGNOSIS — G47.33 OSA (OBSTRUCTIVE SLEEP APNEA): ICD-10-CM

## 2025-01-27 PROCEDURE — 3074F SYST BP LT 130 MM HG: CPT | Performed by: NURSE PRACTITIONER

## 2025-01-27 PROCEDURE — 3078F DIAST BP <80 MM HG: CPT | Performed by: NURSE PRACTITIONER

## 2025-01-27 PROCEDURE — 99213 OFFICE O/P EST LOW 20 MIN: CPT | Performed by: NURSE PRACTITIONER

## 2025-01-27 RX ORDER — VIT B COMP NO.3/FOLIC/C/BIOTIN 1 MG-60 MG
1 TABLET ORAL
COMMUNITY

## 2025-01-27 RX ORDER — HYALURONATE SODIUM, STABILIZED 60 MG/3 ML
60 SYRINGE (ML) INTRAARTICULAR ONCE
COMMUNITY
Start: 2025-01-17

## 2025-01-27 ASSESSMENT — ENCOUNTER SYMPTOMS
CONSTIPATION: 0
NAUSEA: 0
VOMITING: 0
COUGH: 0
WHEEZING: 0
ABDOMINAL PAIN: 0
CHEST TIGHTNESS: 0
SHORTNESS OF BREATH: 0

## 2025-01-27 NOTE — PROGRESS NOTES
[facility] INVASIVE BARIATRIC SURG  [address]  [address]  [place] [id number]  Dept: [phone]    MEDICATION WEIGHT LOSS MANAGEMENT PROGRAM  PROGRESS NOTE     CC: Weight Loss    Patient: Socorro Ramirez      Service Date: 1/27/2025  Visit:   1 month follow up on medications    Medical Record #: [id number]  Current Visit Weight Information  Weight: 93.9 kg (207 lb)   BMI: Body mass index is 36.09 kg/m².        History: 54 y.o. female who presents today for a 1 month follow up on weight loss medication. Patient has been following monthly with our office for the management of obesity since November 2024.    Patient was started on zepbound January 2024. She states she lost 50 lbs. She was using the savings card through drug company but she switched to Medicare and no longer qualified for it. Cost went to have $1000 per month.      She was previously on 12.5mg of zepbound and taking it every 2 weeks to help the prescription last longer. That was not able to be continued due to cost so we switched to compounded semaglutide.     Medication: Semaglutide through [facility]  Amount of weight loss in the last month: 0 lbs  Amount of weight loss total: 1 lbs  Current dose: 1.8 mg  Side Effects: occasional nausea, intermittent. She states it will go away but then she will feel really hungry.    Denies abdominal pain, nausea, vomiting, constipation, diarrhea.     She does feel at times her appetite is higher. She states the zepbound worked a lot better with decrease her appetite. She does feel like she gets full quickly and will stay avery longer.     Diet recall - she states she probably could do better with increasing protein and eating a little bit more.     Height: 1.613 m (5' 3.5\")  Highest Weight:   208 lbs      Exercising?   [] Yes    [x] No   She is waiting to get a knee injection. She states that her knee is very painful. She will do some 
  Medical Nutrition Therapy for Non-Surgical Weight Loss  Nutrition Follow-Up: Weight Loss Medication    Nutrition Assessment:  Patient is taking semaglutide per AIDEE Blevins for weight loss. Patient has lost 0 lbs since last visit.   Patient's nutrition questions include hair loss.  Patient is eating three meals plus a snack daily. Often will have a fairlife shake for a snack. Often eats soup for dinner.  Patient is drinking at least 64 oz of water. Patient is typically drinking water and iced tea sweetened with sweet & low.  Patient reports not adding physical activity to daily life to remain active. Patient is waiting for knee injection to resume exercise.  Discussed emphasis on adequate protein, consistent snack/meals. Encouraged exercise as medically able. Discussed vitamins when asked.    24-hr diet recall scanned into chart.    Vitals:   Vitals:    01/27/25 0904   BP: 100/60   Site: Right Upper Arm   Position: Sitting   Cuff Size: Large Adult   Pulse: 70   Weight: 93.9 kg (207 lb)   Height: 1.613 m (5' 3.5\")      Body mass index is 36.09 kg/m².    Estimated Energy Needs:  Calorie (MSJ x AF - 500): 4387-4644 kcals  Protein: 60-80 grams protein  Fluids: minimum 64 oz fluid    Nutrition Diagnosis:  Overweight/Obesity related to complex combination of decreased energy needs, disordered eating patterns, physical inactivity, and increased psychological/life stress as evidenced by Body mass index is 36.09 kg/m².    Nutrition Intervention:  Diet: Low-Fat Diet, 60-80 gm of protein, and 64 oz of fluid/day.    Nutrition Education: Nutrition Care Manual    Goal:   Eat a variety of fruits and vegetables, lean protein, whole grains and low-fat dairy.   Sip on water or sugar-free fluid throughout the day.   Aim for 3 meals and 1-2 snacks daily.    Discuss activity with physician and determine a plan for remaining active.    Monitor/Evaluate:  Diet tolerance, protein intake, fluid intake, frequency of meals/snacks, 
none

## 2025-02-18 ENCOUNTER — OFFICE VISIT (OUTPATIENT)
Dept: BARIATRICS/WEIGHT MGMT | Age: 55
End: 2025-02-18
Payer: MEDICARE

## 2025-02-18 VITALS
OXYGEN SATURATION: 97 % | WEIGHT: 204 LBS | BODY MASS INDEX: 34.83 KG/M2 | DIASTOLIC BLOOD PRESSURE: 74 MMHG | HEIGHT: 64 IN | SYSTOLIC BLOOD PRESSURE: 114 MMHG | HEART RATE: 92 BPM

## 2025-02-18 DIAGNOSIS — G47.33 OSA (OBSTRUCTIVE SLEEP APNEA): Primary | ICD-10-CM

## 2025-02-18 DIAGNOSIS — E66.01 CLASS 2 SEVERE OBESITY DUE TO EXCESS CALORIES WITH SERIOUS COMORBIDITY AND BODY MASS INDEX (BMI) OF 35.0 TO 35.9 IN ADULT: ICD-10-CM

## 2025-02-18 DIAGNOSIS — I10 PRIMARY HYPERTENSION: ICD-10-CM

## 2025-02-18 DIAGNOSIS — E66.812 CLASS 2 SEVERE OBESITY DUE TO EXCESS CALORIES WITH SERIOUS COMORBIDITY AND BODY MASS INDEX (BMI) OF 35.0 TO 35.9 IN ADULT: ICD-10-CM

## 2025-02-18 PROCEDURE — 3074F SYST BP LT 130 MM HG: CPT | Performed by: NURSE PRACTITIONER

## 2025-02-18 PROCEDURE — 99213 OFFICE O/P EST LOW 20 MIN: CPT | Performed by: NURSE PRACTITIONER

## 2025-02-18 PROCEDURE — 3078F DIAST BP <80 MM HG: CPT | Performed by: NURSE PRACTITIONER

## 2025-02-18 ASSESSMENT — ENCOUNTER SYMPTOMS
CHEST TIGHTNESS: 0
VOMITING: 0
ABDOMINAL PAIN: 0
COUGH: 0
CONSTIPATION: 0
SHORTNESS OF BREATH: 0
WHEEZING: 0
NAUSEA: 0

## 2025-02-18 NOTE — PROGRESS NOTES
[facility] INVASIVE BARIATRIC SURG  [address]  [address]  [place] [id number]  Dept: [phone]    MEDICATION WEIGHT LOSS MANAGEMENT PROGRAM  PROGRESS NOTE     CC: Weight Loss    Patient: Socorro Ramirez      Service Date: 2/18/2025  Visit:   1 month follow up on medications    Medical Record #: [id number]  Current Visit Weight Information  Weight: 92.5 kg (204 lb)   BMI: Body mass index is 35.57 kg/m².        History: 54 y.o. female who presents today for a 1 month follow up on weight loss medication. Patient has been following monthly with our office for the management of obesity since November 2024.    Patient was started on zepbound January 2024. She states she lost 50 lbs. She was using the savings card through drug company but she switched to Medicare and no longer qualified for it. Cost went to have $1000 per month.      She was previously on 12.5mg of zepbound and taking it every 2 weeks to help the prescription last longer. That was not able to be continued due to cost so we switched to compounded semaglutide.     Medication: Semaglutide through [facility]  Amount of weight loss in the last month: 3 lbs  Amount of weight loss total: 4 lbs  Current dose: 1.8 mg  Side Effects: occasional nausea, intermittent. She states it will go away but then she will feel really hungry.    Denies abdominal pain, vomiting, constipation, diarrhea.     Diet recall - she has done better with eating more consistently, increasing protein.     She states she does feel like the dose is working well. She states she will feel hungry but then start eating, gets full quickly and then she will be done. She doesn't have any sweet cravings.    + hx of mild sangeetha. She doesn't wear her cpap and neurology is aware.     Height: 1.613 m (5' 3.5\")  Highest Weight:   208 lbs      Exercising?   [] Yes    [x] No   She is waiting to get a knee injection.   She has been